# Patient Record
Sex: FEMALE | Race: WHITE | HISPANIC OR LATINO | Employment: OTHER | URBAN - METROPOLITAN AREA
[De-identification: names, ages, dates, MRNs, and addresses within clinical notes are randomized per-mention and may not be internally consistent; named-entity substitution may affect disease eponyms.]

---

## 2020-05-13 ENCOUNTER — OFFICE VISIT (OUTPATIENT)
Dept: URGENT CARE | Facility: CLINIC | Age: 70
End: 2020-05-13
Payer: MEDICAID

## 2020-05-13 VITALS
OXYGEN SATURATION: 99 % | HEIGHT: 60 IN | BODY MASS INDEX: 29.45 KG/M2 | WEIGHT: 150 LBS | TEMPERATURE: 98 F | RESPIRATION RATE: 16 BRPM | HEART RATE: 67 BPM

## 2020-05-13 DIAGNOSIS — Z71.89 EDUCATED ABOUT COVID-19 VIRUS INFECTION: Primary | ICD-10-CM

## 2020-05-13 LAB — SARS-COV-2 IGG SERPLBLD QL IA.RAPID: NEGATIVE

## 2020-05-13 PROCEDURE — U0002 COVID-19 LAB TEST NON-CDC: HCPCS

## 2020-05-13 PROCEDURE — 99201 PR OFFICE/OUTPT VISIT,NEW,LEVL I: CPT | Mod: S$GLB,,, | Performed by: PHYSICIAN ASSISTANT

## 2020-05-13 PROCEDURE — 86769 SARS-COV-2 COVID-19 ANTIBODY: CPT

## 2020-05-13 PROCEDURE — 99201 PR OFFICE/OUTPT VISIT,NEW,LEVL I: ICD-10-PCS | Mod: S$GLB,,, | Performed by: PHYSICIAN ASSISTANT

## 2020-05-13 RX ORDER — BRIMONIDINE TARTRATE, TIMOLOL MALEATE 2; 5 MG/ML; MG/ML
SOLUTION/ DROPS OPHTHALMIC
COMMUNITY
Start: 2020-04-16

## 2020-05-13 RX ORDER — ROSUVASTATIN CALCIUM 10 MG/1
TABLET, COATED ORAL
COMMUNITY
Start: 2020-03-19

## 2020-05-13 NOTE — PROGRESS NOTES
Subjective:       Patient ID: Melisa Winkler is a 70 y.o. female.    Vitals:  height is 5' (1.524 m) and weight is 68 kg (150 lb). Her temperature is 98.3 °F (36.8 °C). Her pulse is 67. Her respiration is 16 and oxygen saturation is 99%.     Chief Complaint: Labs Only (covid -19 swab)    Patient requesting COVID testing. She denies any symptoms at this time. Reports that she does occasionally have a runny nose and mild cough however nothing different than usual. Reports that she has been staying at home and when she does go out, she always wears a mask.     was used for this visit.      Constitution: Negative for chills, sweating, fatigue and fever.   HENT: Negative for ear pain, congestion, sinus pain, sinus pressure, sore throat and voice change.    Neck: Negative for painful lymph nodes.   Eyes: Negative for eye redness.   Respiratory: Negative for chest tightness, cough, sputum production, bloody sputum, COPD, shortness of breath, stridor, wheezing and asthma.    Gastrointestinal: Negative for nausea and vomiting.   Musculoskeletal: Negative for muscle ache.   Skin: Negative for rash.   Allergic/Immunologic: Negative for seasonal allergies and asthma.   Hematologic/Lymphatic: Negative for swollen lymph nodes.       Objective:      Physical Exam   Constitutional: She is oriented to person, place, and time. She appears well-developed and well-nourished. She is cooperative.  Non-toxic appearance. She does not have a sickly appearance. She does not appear ill. No distress.   HENT:   Head: Normocephalic and atraumatic.   Right Ear: Hearing, tympanic membrane, external ear and ear canal normal.   Left Ear: Hearing, tympanic membrane, external ear and ear canal normal.   Nose: Nose normal. No mucosal edema, rhinorrhea or nasal deformity. No epistaxis. Right sinus exhibits no maxillary sinus tenderness and no frontal sinus tenderness. Left sinus exhibits no maxillary sinus tenderness and  no frontal sinus tenderness.   Mouth/Throat: Uvula is midline, oropharynx is clear and moist and mucous membranes are normal. No trismus in the jaw. Normal dentition. No uvula swelling. No oropharyngeal exudate, posterior oropharyngeal edema or posterior oropharyngeal erythema.   Eyes: Conjunctivae and lids are normal. No scleral icterus.   Neck: Trachea normal, full passive range of motion without pain and phonation normal. Neck supple. No neck rigidity. No edema and no erythema present.   Cardiovascular: Normal rate, regular rhythm, normal heart sounds, intact distal pulses and normal pulses.   Pulmonary/Chest: Effort normal and breath sounds normal. No respiratory distress. She has no decreased breath sounds. She has no rhonchi.   Abdominal: Normal appearance.   Musculoskeletal: Normal range of motion. She exhibits no edema or deformity.   Neurological: She is alert and oriented to person, place, and time. She exhibits normal muscle tone. Coordination normal.   Skin: Skin is warm, dry, intact, not diaphoretic and not pale.   Psychiatric: She has a normal mood and affect. Her speech is normal and behavior is normal. Judgment and thought content normal. Cognition and memory are normal.   Nursing note and vitals reviewed.        Assessment:       1. Educated About Covid-19 Virus Infection        Plan:         Educated About Covid-19 Virus Infection  -     COVID-19 Routine Screening  -     COVID-19 (SARS CoV-2) IgG Antibody    Patient is currently asymptomatic and was educated on the difference between the COVID nasal swab and antibody test. Patient would like to have both done at this time. Counseling given. Patient instructed to continue social distancing and staying home until we get the results back in 24-48 hours. All questions answered. Nasal swab was obtained and blood was drawn.      Patient Instructions   What does this antibody test do? This test determines if a person's immune system has created antibodies  in response to COVID-19. Presence of the antibody indicates the individual has been infected with COVID-19. It is important to note that this test does not prove that a person is immune to future infection with COVID-19. Because this virus is new, there is not enough information at this time to determine what defines COVID-19 immunity and how long immunity may last.     What do the results mean? Test results are provided within 24-36 hours of collection. Those with a positive test should be aware that they have been previously infected. Individuals with a negative antibody test should be aware that they have not been infected by the virus or developed antibodies to COVID-19. Social distancing, good hand hygiene and wearing face mask in public are all still encouraged, regardless of the test result.     Will insurance pay for this test? Antibody testing will be determined by each insurance company based upon medical necessity.    [facility] [place]  Prevenir la propagación del Coronoavirus 2019 en hogares y comunidades residenciales      Pasos preventivos para personas con COVID-19 o que se sospecha que están infectadas (incluidas personas bajo investigación) que no necesitan estar hospitalizadas y personas infectadas con COVID-19 que hayan estado hospitalizadas ike que se determinan medicamente estables para irse a casa.    Jean médico y el personal de sanidad pública evaluarán si usted puede ser tratado en casa.   Quédese en casa excepto para obtener cuidados médicos.   Sepárese de otras personas y animales en jean hogar.   Llame por teléfono antes de ir a luis a jean médico.   Póngase amna mascarilla.   Tápese al toser y estornudar.   Lávese las salma con frecuencia.   Evite compartir objetos personales en jean hogar.   Limpie todas las superficies a jean alcance todos los días.    Monitoree eliu síntomas. Consiga ayuda médica si la enfermedad empeora (por ejemplo, dificultad para  respirar). Antes de salir a buscar ayuda, llame a jean proveedor médico.    Si tiene amna emergencia médica y necesita llamar al 911, notifique al personal que responda a jean llamada de que usted tiene, o está siendo evaluado para COVID-19. Si es posible, póngase amna mascarilla antes de que la ayuda sanitaria llegue.   Dejar de hacer aislamiento en casa. Llame a jean médico para que le asesore sobre si puede dejar de hacer aislamiento en casa.    Precauciones recomendadas para miembros del mismo hogar, compañeros íntimos y cuidadores, fuera del ámbito médico, de un paciente sintomático confirmado positivo para COVID-19 o un paciente que está siendo investigado.  Miembros del mismo hogar, compañeros íntimos y cuidadores, fuera del ámbito médico, pueden abe estado en contacto con amna persona con síntomas confirmada positivo para COVID-19 o amna persona bajo investigación. Personas con contacto cercano deberían monitorizar jean matt; deberían llamar a jean proveedor médico inmediatamente si desarrollan síntomas que sugieren que puede abe contraído COVID-19 (por ejemplo, fiebre, tos, falta de aliento).    Personas con contacto cercano deberían, además, seguir las siguientes recomendaciones:   Asegúrese de que usted puede entender y ayudar al paciente a seguir las instrucciones de jean proveedor médico, en relación con la medicación y el cuidado a seguir. Debería ayudar al paciente con eliu necesidades básicas en casa y ayudar cuando sea necesario a hacer la compra, ir a recoger las recetas médicas y otras necesidades personales.   Monitorear los síntomas del paciente. Si el paciente empeora, llame a jean proveedor médico y dígale que el paciente curtis sido confirmado positivo para COVID-19. [employer] ayudará a la oficina de jean médico a michaela las medidas adecuadas para evitar que otras personas en la oficina o en la manuel de espera se infecten. Pida al proveedor médico que llame al departamento de matt del estado para más  instrucciones. Si el paciente tiene amna emergencia médica y tiene que llamar al 911, informe al operador que responda a jean llamada de que el paciente tiene o está siendo evaluado para COVID-19.   Miembros del mismo hogar deberían quedarse en habitaciones separadas del paciente lo rashmi posible. Miembros del mismo hogar deberían utilizar otro dormitorio y cuarto de baño, si fuera posible.    Prohibir la visita de cualquier persona que no necesite estar en la casa.   Miembros del mismo hogar deberían ocuparse de las mascotas de la casa. No cuide de animales o mascotas mientras esté enfermo.   Asegúrese de que las áreas comunes de la casa tienen buena ventilación, yogi aire acondicionado o amna ventana que se pueda abrir si el clima lo permite.   Lávese las salma frecuentemente. Lávese las salma frecuentemente con jabón y agua henna al menos 20 segundos o utilice un desinfectante de salma con base de alcohol, que contenga entre 60 y 95% de alcohol. Cubriendo todas las superficies de las salma y frotándolas juntas hasta que se sequen.   Evite tocarse los ojos, la nariz y la boca antes de haberse lavado las salma.   El paciente debería llevar mascarilla cuando esté con otras personas. Si el paciente no se puede poner amna mascarilla porque, por ejemplo, tiene dificultad para respirar, usted, yogi cuidador, debería ponerse amna mascarilla cuando esté en la misma habitación que el paciente.   Utilice amna mascarilla desechable y guantes cuando toque o esté en contacto con la sally del paciente, eliu heces, eliu fluidos corporales tales yogi saliva, esputo, mucosidad nasal, vómito, orina.   o Tire las mascarillas desechables y los guantes saige pues de utilizarlos. No los reutilice.  o Cuando se quite la protección, razia quítese los guantes. Inmediatamente después lávese las salma con agua y jabón o con un desinfectante de salma con base de alcohol. Después quítese y tire la mascarilla, e inmediatamente después  lávese las salma otra vez con agua y jabón o utilice un desinfectante de salma con base de alcohol.   Evite compartir objetos del hogar con el paciente. No debería compartir platos, vasos, tazas, cubiertos, toallas, ropa de cama u otros objetos. Después de que el paciente utilice estos objetos debe lavarlos minuciosamente (toya debajo Jesus la colada minuciosamente).   Limpie todas las superficies de fácil alcance, yogi las encimeras, las mesas, los pomos de las juan manuel, los picaportes del baño, el retrete, teléfonos, teclados, tabletas y las mesillas de noche, todos los días. Además, lave cualquier superficie que pueda tener sally, heces o fluidos corporales.   Utilice los productos de limpieza o las toallitas según indiquen las etiquetas de los mismos. Las etiquetas contienen la información para utilizar estos productos de manera loja y eficiente, además de las precauciones que debe michaela al utilizar dichos productos, tales yogi el uso de guantes o buena ventilación.   Lave la colada minuciosamente.  o Retire inmediatamente cualquier prenda o ropa de cama que tenga sally, heces o fluidos corporales.  o Utilice guantes desechables cuando toque objetos sucios y separe los objetos sucios de jean cuerpo. Lávese las salma (con jabón y agua o con un desinfectante de salma con base de alcohol) inmediatamente después de quitarse los guantes.  o Rupali y siga las instrucciones en las etiquetas de la ropa y el detergente. En general, utilice un detergente corriente siguiendo las instrucciones de la lavadora y séquelo meticulosamente utilizando la temperatura mas georgia recomendada en la etiqueta de la ropa.   Coloque todos los guantes desechables, las mascarillas y otros objetos contaminados en un contenedor reforzado antes de tirarlo junto con otros desechos del hogar. Lávese las salma (con jabón y agua o con un desinfectante de salma con base de alcohol) inmediatamente después de tocar estos objetos. Si las salma  están visiblemente sucias se recomienda lavarlas con agua y con jabón.    Consulte cualquier otra pregunta con jean departamento de matt local o estatal o con jean proveedor médico. Compruebe las horas en las que se puede contactar al departamento de matt local.    Para más información, siga el enlace del CDC que encontrará a continuación.    https://www.cdc.gov/coronavirus/2019-ncov/hcp/guidance-prevent-spread-sp.html

## 2020-05-13 NOTE — PATIENT INSTRUCTIONS
What does this antibody test do? This test determines if a person's immune system has created antibodies in response to COVID-19. Presence of the antibody indicates the individual has been infected with COVID-19. It is important to note that this test does not prove that a person is immune to future infection with COVID-19. Because this virus is new, there is not enough information at this time to determine what defines COVID-19 immunity and how long immunity may last.     What do the results mean? Test results are provided within 24-36 hours of collection. Those with a positive test should be aware that they have been previously infected. Individuals with a negative antibody test should be aware that they have not been infected by the virus or developed antibodies to COVID-19. Social distancing, good hand hygiene and wearing face mask in public are all still encouraged, regardless of the test result.     Will insurance pay for this test? Antibody testing will be determined by each insurance company based upon medical necessity.    [facility]  Prevenir la propagación del Coronoavirus 2019 en hogares y comunidades residenciales      Pasos preventivos para personas con COVID-19 o que se sospecha que están infectadas (incluidas personas bajo investigación) que no necesitan estar hospitalizadas y personas infectadas con COVID-19 que hayan estado hospitalizadas ike que se determinan medicamente estables para irse a casa.    Jean médico y el personal de sanidad pública evaluarán si usted puede ser tratado en casa.   Quédese en casa excepto para obtener cuidados médicos.   Sepárese de otras personas y animales en jean hogar.   Llame por teléfono antes de ir a luis a jean médico.   Póngase amna mascarilla.   Tápese al toser y estornudar.   Lávese las salma con frecuencia.   Evite compartir objetos personales en jean hogar.   Limpie todas las superficies a jean alcance todos los días.     Monitoree eliu síntomas. Consiga ayuda médica si la enfermedad empeora (por ejemplo, dificultad para respirar). Antes de salir a buscar ayuda, llame a jean proveedor médico.    Si tiene amna emergencia médica y necesita llamar al 911, notifique al personal que responda a jean llamada de que usted tiene, o está siendo evaluado para COVID-19. Si es posible, póngase amna mascarilla antes de que la ayuda sanitaria llegue.   Dejar de hacer aislamiento en casa. Llame a jean médico para que le asesore sobre si puede dejar de hacer aislamiento en casa.    Precauciones recomendadas para miembros del mismo hogar, compañeros íntimos y cuidadores, fuera del ámbito médico, de un paciente sintomático confirmado positivo para COVID-19 o un paciente que está siendo investigado.  Miembros del mismo hogar, compañeros íntimos y cuidadores, fuera del ámbito médico, pueden abe estado en contacto con amna persona con síntomas confirmada positivo para COVID-19 o amna persona bajo investigación. Personas con contacto cercano deberían monitorizar jean matt; deberían llamar a jean proveedor médico inmediatamente si desarrollan síntomas que sugieren que puede abe contraído COVID-19 (por ejemplo, fiebre, tos, falta de aliento).    Personas con contacto cercano deberían, además, seguir las siguientes recomendaciones:   Asegúrese de que usted puede entender y ayudar al paciente a seguir las instrucciones de jean proveedor médico, en relación con la medicación y el cuidado a seguir. Debería ayudar al paciente con eliu necesidades básicas en casa y ayudar cuando sea necesario a hacer la compra, ir a recoger las recetas médicas y otras necesidades personales.   Monitorear los síntomas del paciente. Si el paciente empeora, llame a jean proveedor médico y dígale que el paciente curtis sido confirmado positivo para COVID-19. [place] ayudará a la oficina de jean médico a michaela las medidas adecuadas para evitar que otras personas en la oficina o en la manuel de espera se  infecten. Pida al proveedor médico que llame al departamento de matt del estado para más instrucciones. Si el paciente tiene amna emergencia médica y tiene que llamar al 911, informe al operador que responda a jean llamada de que el paciente tiene o está siendo evaluado para COVID-19.   Miembros del mismo hogar deberían quedarse en habitaciones separadas del paciente lo rashmi posible. Miembros del mismo hogar deberían utilizar otro dormitorio y cuarto de baño, si fuera posible.    Prohibir la visita de cualquier persona que no necesite estar en la casa.   Miembros del mismo hogar deberían ocuparse de las mascotas de la casa. No cuide de animales o mascotas mientras esté enfermo.   Asegúrese de que las áreas comunes de la casa tienen buena ventilación, yogi aire acondicionado o amna ventana que se pueda abrir si el clima lo permite.   Lávese las salma frecuentemente. Lávese las salma frecuentemente con jabón y agua henna al menos 20 segundos o utilice un desinfectante de salma con base de alcohol, que contenga entre 60 y 95% de alcohol. Cubriendo todas las superficies de las salma y frotándolas juntas hasta que se sequen.   Evite tocarse los ojos, la nariz y la boca antes de haberse lavado las salma.   El paciente debería llevar mascarilla cuando esté con otras personas. Si el paciente no se puede poner amna mascarilla porque, por ejemplo, tiene dificultad para respirar, usted, yogi cuidador, debería ponerse amna mascarilla cuando esté en la misma habitación que el paciente.   Utilice amna mascarilla desechable y guantes cuando toque o esté en contacto con la sally del paciente, eliu heces, eliu fluidos corporales tales yogi saliva, esputo, mucosidad nasal, vómito, orina.   o Tire las mascarillas desechables y los guantes saige pues de utilizarlos. No los reutilice.  o Cuando se quite la protección, razia quítese los guantes. Inmediatamente después lávese las salma con agua y jabón o con un desinfectante de salma  con base de alcohol. Después quítese y tire la mascarilla, e inmediatamente después lávese las salma otra vez con agua y jabón o utilice un desinfectante de salma con base de alcohol.   Evite compartir objetos del hogar con el paciente. No debería compartir platos, vasos, tazas, cubiertos, toallas, ropa de cama u otros objetos. Después de que el paciente utilice estos objetos debe lavarlos minuciosamente (toya debajo Jesus la colada minuciosamente).   Limpie todas las superficies de fácil alcance, yogi las encimeras, las mesas, los pomos de las juan manuel, los picaportes del baño, el retrete, teléfonos, teclados, tabletas y las mesillas de noche, todos los días. Además, lave cualquier superficie que pueda tener salyl, heces o fluidos corporales.   Utilice los productos de limpieza o las toallitas según indiquen las etiquetas de los mismos. Las etiquetas contienen la información para utilizar estos productos de manera loja y eficiente, además de las precauciones que debe michaela al utilizar dichos productos, tales yogi el uso de guantes o buena ventilación.   Lave la colada minuciosamente.  o Retire inmediatamente cualquier prenda o ropa de cama que tenga aslly, heces o fluidos corporales.  o Utilice guantes desechables cuando toque objetos sucios y separe los objetos sucios de jean cuerpo. Lávese las salma (con jabón y agua o con un desinfectante de salma con base de alcohol) inmediatamente después de quitarse los guantes.  o Rupali y siga las instrucciones en las etiquetas de la ropa y el detergente. En general, utilice un detergente corriente siguiendo las instrucciones de la lavadora y séquelo meticulosamente utilizando la temperatura mas georgia recomendada en la etiqueta de la ropa.   Coloque todos los guantes desechables, las mascarillas y otros objetos contaminados en un contenedor reforzado antes de tirarlo junto con otros desechos del hogar. Lávese las salma (con jabón y agua o con un desinfectante de salma  con base de alcohol) inmediatamente después de tocar estos objetos. Si las salma están visiblemente sucias se recomienda lavarlas con agua y con jabón.    Consulte cualquier otra pregunta con jean departamento de matt local o estatal o con jean proveedor médico. Compruebe las horas en las que se puede contactar al departamento de matt local.    Para más información, siga el enlace del CDC que encontrará a continuación.    https://www.cdc.gov/coronavirus/2019-ncov/hcp/guidance-prevent-spread-sp.html

## 2020-05-14 ENCOUNTER — TELEPHONE (OUTPATIENT)
Dept: URGENT CARE | Facility: CLINIC | Age: 70
End: 2020-05-14

## 2020-05-14 LAB — SARS-COV-2 RNA RESP QL NAA+PROBE: NOT DETECTED

## 2020-05-28 ENCOUNTER — OFFICE VISIT (OUTPATIENT)
Dept: URGENT CARE | Facility: CLINIC | Age: 70
End: 2020-05-28
Payer: MEDICAID

## 2020-05-28 VITALS
BODY MASS INDEX: 29.45 KG/M2 | HEIGHT: 60 IN | OXYGEN SATURATION: 98 % | TEMPERATURE: 99 F | HEART RATE: 83 BPM | WEIGHT: 150 LBS

## 2020-05-28 DIAGNOSIS — Z20.822 EXPOSURE TO COVID-19 VIRUS: ICD-10-CM

## 2020-05-28 DIAGNOSIS — B34.9 VIRAL SYNDROME: Primary | ICD-10-CM

## 2020-05-28 PROCEDURE — U0003 INFECTIOUS AGENT DETECTION BY NUCLEIC ACID (DNA OR RNA); SEVERE ACUTE RESPIRATORY SYNDROME CORONAVIRUS 2 (SARS-COV-2) (CORONAVIRUS DISEASE [COVID-19]), AMPLIFIED PROBE TECHNIQUE, MAKING USE OF HIGH THROUGHPUT TECHNOLOGIES AS DESCRIBED BY CMS-2020-01-R: HCPCS

## 2020-05-28 PROCEDURE — 99213 PR OFFICE/OUTPT VISIT, EST, LEVL III, 20-29 MIN: ICD-10-PCS | Mod: S$GLB,,, | Performed by: PHYSICIAN ASSISTANT

## 2020-05-28 PROCEDURE — 99213 OFFICE O/P EST LOW 20 MIN: CPT | Mod: S$GLB,,, | Performed by: PHYSICIAN ASSISTANT

## 2020-05-28 RX ORDER — BENZONATATE 200 MG/1
200 CAPSULE ORAL 3 TIMES DAILY PRN
Qty: 30 CAPSULE | Refills: 0 | Status: SHIPPED | OUTPATIENT
Start: 2020-05-28 | End: 2020-06-07

## 2020-05-28 NOTE — PROGRESS NOTES
Subjective:       Patient ID: Melisa Winkler is a 70 y.o. female.    Vitals:  height is 5' (1.524 m) and weight is 68 kg (150 lb). Her tympanic temperature is 99.4 °F (37.4 °C). Her pulse is 83. Her oxygen saturation is 98%.     Chief Complaint: COVID-19 Concerns    Ms. Vincent presents with her son for evaluation of mild congestion and cough for the past 6 days.  She denies any body aches, fevers, chills, nausea, vomiting, diarrhea.  She denies any shortness of breath, chest pain, bilateral lower extremity edema.  She lives in a household with her son and her son's girlfriend, which just tested positive for COVID-19 today.  She has been taking Mucinex with relief of symptoms.        Constitution: Negative for chills, sweating, fatigue and fever.   HENT: Positive for congestion. Negative for ear pain, sinus pain, sinus pressure, sore throat and voice change.    Neck: Negative for painful lymph nodes.   Eyes: Negative for eye redness.   Respiratory: Positive for cough. Negative for chest tightness, sputum production, bloody sputum, COPD, shortness of breath, stridor, wheezing and asthma.    Gastrointestinal: Negative for nausea and vomiting.   Musculoskeletal: Negative for muscle ache.   Skin: Negative for rash.   Allergic/Immunologic: Negative for seasonal allergies and asthma.   Hematologic/Lymphatic: Negative for swollen lymph nodes.       Objective:      Physical Exam   Constitutional: She is oriented to person, place, and time. She appears well-developed and well-nourished. She is cooperative.  Non-toxic appearance. She does not have a sickly appearance. She does not appear ill. No distress.   HENT:   Head: Normocephalic and atraumatic.   Right Ear: Hearing, tympanic membrane, external ear and ear canal normal.   Left Ear: Hearing, tympanic membrane, external ear and ear canal normal.   Nose: Nose normal. No mucosal edema, rhinorrhea or nasal deformity. No epistaxis. Right sinus exhibits no maxillary  sinus tenderness and no frontal sinus tenderness. Left sinus exhibits no maxillary sinus tenderness and no frontal sinus tenderness.   Mouth/Throat: Uvula is midline, oropharynx is clear and moist and mucous membranes are normal. No trismus in the jaw. Normal dentition. No uvula swelling. No oropharyngeal exudate, posterior oropharyngeal edema or posterior oropharyngeal erythema.   Eyes: Conjunctivae and lids are normal. No scleral icterus.   Neck: Trachea normal, full passive range of motion without pain and phonation normal. Neck supple. No neck rigidity. No edema and no erythema present.   Cardiovascular: Normal rate, regular rhythm, normal heart sounds, intact distal pulses and normal pulses.   Pulmonary/Chest: Effort normal and breath sounds normal. No stridor. No respiratory distress. She has no decreased breath sounds. She has no wheezes. She has no rhonchi. She has no rales.   Abdominal: Normal appearance.   Musculoskeletal: Normal range of motion. She exhibits no edema or deformity.   Neurological: She is alert and oriented to person, place, and time. She exhibits normal muscle tone. Coordination normal.   Skin: Skin is warm, dry, intact, not diaphoretic and not pale.   Psychiatric: She has a normal mood and affect. Her speech is normal and behavior is normal. Judgment and thought content normal. Cognition and memory are normal.   Nursing note and vitals reviewed.        Assessment:       1. Viral syndrome    2. Exposure to Covid-19 Virus        Plan:         Viral syndrome  -     COVID-19 Routine Screening    Exposure to Covid-19 Virus    Other orders  -     benzonatate (TESSALON) 200 MG capsule; Take 1 capsule (200 mg total) by mouth 3 (three) times daily as needed for Cough (cough).  Dispense: 30 capsule; Refill: 0      Patient Instructions   PLEASE READ YOUR DISCHARGE INSTRUCTIONS ENTIRELY AS IT CONTAINS IMPORTANT INFORMATION.  Please quarantine at home until your  COVID-19 testing results.  -  "Rest.    - Drink plenty of fluids.    - Tylenol or Ibuprofen as directed as needed for fever/pain.    - If you were prescribed antibiotics, please take them to completion.  - If you are female and on birth control pills - please use additional methods of contraception to prevent pregnancy while on antibiotics and for one cycle after.   - If you were prescribed a narcotic medication or muscle relaxer, do not drive or operate heavy equipment or machinery while taking these medications, as they can cause drowsiness.   - If you smoke, please stop smoking.  -You must understand that you've received an Urgent Care treatment only and that you may be released before all your medical problems are known or treated. You, the patient, will    arrange for follow up care as instructed. Please arrange follow up with your primary medical clinic as soon as possible.   - Follow up with your PCP or specialty clinic as directed in the next 1-2 weeks if not improved or as needed.  You can call [phone] to schedule an appointment with the appropriate provider.    - Please return to Urgent Care or to the Emergency Department if your symptoms worsen.    Patient aware and verbalized understanding.    Viral Syndrome (Adult)  A viral illness may cause a number of symptoms. The symptoms depend on the part of the body that the virus affects. If it settles in your nose, throat, and lungs, it may cause cough, sore throat, congestion, and sometimes headache. If it settles in your stomach and intestinal tract, it may cause vomiting and diarrhea. Sometimes it causes vague symptoms like "aching all over," feeling tired, loss of appetite, or fever.  A viral illness usually lasts 1 to 2 weeks, but sometimes it lasts longer. In some cases, a more serious infection can look like a viral syndrome in the first few days of the illness. You may need another exam and additional tests to know the difference. Watch for the warning signs listed " below.  Home care  Follow these guidelines for taking care of yourself at home:  · If symptoms are severe, rest at home for the first 2 to 3 days.  · Stay away from cigarette smoke - both your smoke and the smoke from others.  · You may use over-the-counter acetaminophen or ibuprofen for fever, muscle aching, and headache, unless another medicine was prescribed for this. If you have chronic liver or kidney disease or ever had a stomach ulcer or GI bleeding, talk with your doctor before using these medicines. No one who is younger than 18 and ill with a fever should take aspirin. It may cause severe disease or death.  · Your appetite may be poor, so a light diet is fine. Avoid dehydration by drinking 8 to 12 8-ounce glasses of fluids each day. This may include water; orange juice; lemonade; apple, grape, and cranberry juice; clear fruit drinks; electrolyte replacement and sports drinks; and decaffeinated teas and coffee. If you have been diagnosed with a kidney disease, ask your doctor how much and what types of fluids you should drink to prevent dehydration. If you have kidney disease, drinking too much fluid can cause it build up in the your body and be dangerous to your health.  · Over-the-counter remedies won't shorten the length of the illness but may be helpful for cough, sore throat; and nasal and sinus congestion. Don't use decongestants if you have high blood pressure.  Follow-up care  Follow up with your healthcare provider if you do not improve over the next week.  Call 911  Get emergency medical care if any of the following occur:  · Convulsion  · Feeling weak, dizzy, or like you are going to faint  · Chest pain, shortness of breath, wheezing, or difficulty breathing  When to seek medical advice  Call your healthcare provider right away if any of these occur:  · Cough with lots of colored sputum (mucus) or blood in your sputum  · Chest pain, shortness of breath, wheezing, or difficulty breathing  · Severe  headache; face, neck, or ear pain  · Severe, constant pain in the lower right side of your belly (abdominal)  · Continued vomiting (cant keep liquids down)  · Frequent diarrhea (more than 5 times a day); blood (red or black color) or mucus in diarrhea  · Feeling weak, dizzy, or like you are going to faint  · Extreme thirst  · Fever of 100.4°F (38°C) or higher, or as directed by your healthcare provider  Date Last Reviewed: 9/25/2015  © 6736-5060 [employer]. [address] [place] [id number]. All rights reserved. This information is not intended as a substitute for professional medical care. Always follow your healthcare professional's instructions.      [facility]  Prevenir la propagación del Coronoavirus 2019 en hogares y comunidades residenciales      Pasos preventivos para personas con COVID-19 o que se sospecha que están infectadas (incluidas personas bajo investigación) que no necesitan estar hospitalizadas y personas infectadas con COVID-19 que hayan estado hospitalizadas ike que se determinan medicamente estables para irse a casa.    Jean médico y el personal de sanidad pública evaluarán si usted puede ser tratado en casa.   Quédese en casa excepto para obtener cuidados médicos.   Sepárese de otras personas y animales en jean hogar.   Llame por teléfono antes de ir a luis a jean médico.   Póngase amna mascarilla.   Tápese al toser y estornudar.   Lávese las salma con frecuencia.   Evite compartir objetos personales en jean hogar.   Limpie todas las superficies a jean alcance todos los días.    Monitoree eliu síntomas. Consiga ayuda médica si la enfermedad empeora (por ejemplo, dificultad para respirar). Antes de salir a buscar ayuda, llame a jean proveedor médico.    Si tiene amna emergencia médica y necesita llamar al 911, notifique al personal que responda a jean llamada de que usted tiene, o está siendo evaluado para COVID-19. Si es posible, póngase amna  mascarilla antes de que la ayuda sanitaria llegue.   Dejar de hacer aislamiento en casa. Llame a jean médico para que le asesore sobre si puede dejar de hacer aislamiento en casa.    Precauciones recomendadas para miembros del mismo hogar, compañeros íntimos y cuidadores, fuera del ámbito médico, de un paciente sintomático confirmado positivo para COVID-19 o un paciente que está siendo investigado.  Miembros del mismo hogar, compañeros íntimos y cuidadores, fuera del ámbito médico, pueden abe estado en contacto con amna persona con síntomas confirmada positivo para COVID-19 o amna persona bajo investigación. Personas con contacto cercano deberían monitorizar jean matt; deberían llamar a jean proveedor médico inmediatamente si desarrollan síntomas que sugieren que puede abe contraído COVID-19 (por ejemplo, fiebre, tos, falta de aliento).    Personas con contacto cercano deberían, además, seguir las siguientes recomendaciones:   Asegúrese de que usted puede entender y ayudar al paciente a seguir las instrucciones de jean proveedor médico, en relación con la medicación y el cuidado a seguir. Debería ayudar al paciente con eliu necesidades básicas en casa y ayudar cuando sea necesario a hacer la compra, ir a recoger las recetas médicas y otras necesidades personales.   Monitorear los síntomas del paciente. Si el paciente empeora, llame a jean proveedor médico y dígale que el paciente curtis sido confirmado positivo para COVID-19. [place] ayudará a la oficina de jean médico a michaela las medidas adecuadas para evitar que otras personas en la oficina o en la manuel de espera se infecten. Pida al proveedor médico que llame al departamento de matt del estado para más instrucciones. Si el paciente tiene amna emergencia médica y tiene que llamar al 911, informe al operador que responda a jean llamada de que el paciente tiene o está siendo evaluado para COVID-19.   Miembros del mismo hogar deberían quedarse en habitaciones separadas del paciente  lo rashmi posible. Miembros del mismo hogar deberían utilizar otro dormitorio y cuarto de baño, si fuera posible.    Prohibir la visita de cualquier persona que no necesite estar en la casa.   Miembros del mismo hogar deberían ocuparse de las mascotas de la casa. No cuide de animales o mascotas mientras esté enfermo.   Asegúrese de que las áreas comunes de la casa tienen buena ventilación, yogi aire acondicionado o amna ventana que se pueda abrir si el clima lo permite.   Lávese las salma frecuentemente. Lávese las salma frecuentemente con jabón y agua henna al menos 20 segundos o utilice un desinfectante de salma con base de alcohol, que contenga entre 60 y 95% de alcohol. Cubriendo todas las superficies de las salma y frotándolas juntas hasta que se sequen.   Evite tocarse los ojos, la nariz y la boca antes de haberse lavado las salma.   El paciente debería llevar mascarilla cuando esté con otras personas. Si el paciente no se puede poner amna mascarilla porque, por ejemplo, tiene dificultad para respirar, usted, yogi cuidador, debería ponerse amna mascarilla cuando esté en la misma habitación que el paciente.   Utilice amna mascarilla desechable y guantes cuando toque o esté en contacto con la sally del paciente, eliu heces, eliu fluidos corporales tales yogi saliva, esputo, mucosidad nasal, vómito, orina.   o Tire las mascarillas desechables y los guantes saige pues de utilizarlos. No los reutilice.  o Cuando se quite la protección, razia quítese los guantes. Inmediatamente después lávese las salma con agua y jabón o con un desinfectante de salma con base de alcohol. Después quítese y tire la mascarilla, e inmediatamente después lávese las salma otra vez con agua y jabón o utilice un desinfectante de salma con base de alcohol.   Evite compartir objetos del hogar con el paciente. No debería compartir platos, vasos, tazas, cubiertos, toallas, ropa de cama u otros objetos. Después de que el paciente utilice  estos objetos debe lavarlos minuciosamente (toya debajo Jesus la colada minuciosamente).   Limpie todas las superficies de fácil alcance, yogi las encimeras, las mesas, los pomos de las juan manuel, los picaportes del baño, el retrete, teléfonos, teclados, tabletas y las mesillas de noche, todos los días. Además, lave cualquier superficie que pueda tener sally, heces o fluidos corporales.   Utilice los productos de limpieza o las toallitas según indiquen las etiquetas de los mismos. Las etiquetas contienen la información para utilizar estos productos de manera loja y eficiente, además de las precauciones que debe michaela al utilizar dichos productos, tales yogi el uso de guantes o buena ventilación.   Lave la colada minuciosamente.  o Retire inmediatamente cualquier prenda o ropa de cama que tenga sally, heces o fluidos corporales.  o Utilice guantes desechables cuando toque objetos sucios y separe los objetos sucios de jean cuerpo. Lávese las salma (con jabón y agua o con un desinfectante de salma con base de alcohol) inmediatamente después de quitarse los guantes.  o Rupali y siga las instrucciones en las etiquetas de la ropa y el detergente. En general, utilice un detergente corriente siguiendo las instrucciones de la lavadora y séquelo meticulosamente utilizando la temperatura mas georgia recomendada en la etiqueta de la ropa.   Coloque todos los guantes desechables, las mascarillas y otros objetos contaminados en un contenedor reforzado antes de tirarlo junto con otros desechos del hogar. Lávese las salma (con jabón y agua o con un desinfectante de salma con base de alcohol) inmediatamente después de tocar estos objetos. Si las salma están visiblemente sucias se recomienda lavarlas con agua y con jabón.    Consulte cualquier otra pregunta con jean departamento de matt local o estatal o con jean proveedor médico. Compruebe las horas en las que se puede contactar al departamento de matt local.    Para más  información, siga el enlace del CDC que encontrará a continuación.    https://www.cdc.gov/coronavirus/2019-ncov/hcp/guidance-prevent-spread-sp.html

## 2020-05-28 NOTE — PATIENT INSTRUCTIONS
"PLEASE READ YOUR DISCHARGE INSTRUCTIONS ENTIRELY AS IT CONTAINS IMPORTANT INFORMATION.  Please quarantine at home until your  COVID-19 testing results.  - Rest.    - Drink plenty of fluids.    - Tylenol or Ibuprofen as directed as needed for fever/pain.    - If you were prescribed antibiotics, please take them to completion.  - If you are female and on birth control pills - please use additional methods of contraception to prevent pregnancy while on antibiotics and for one cycle after.   - If you were prescribed a narcotic medication or muscle relaxer, do not drive or operate heavy equipment or machinery while taking these medications, as they can cause drowsiness.   - If you smoke, please stop smoking.  -You must understand that you've received an Urgent Care treatment only and that you may be released before all your medical problems are known or treated. You, the patient, will    arrange for follow up care as instructed. Please arrange follow up with your primary medical clinic as soon as possible.   - Follow up with your PCP or specialty clinic as directed in the next 1-2 weeks if not improved or as needed.  You can call [phone] to schedule an appointment with the appropriate provider.    - Please return to Urgent Care or to the Emergency Department if your symptoms worsen.    Patient aware and verbalized understanding.    Viral Syndrome (Adult)  A viral illness may cause a number of symptoms. The symptoms depend on the part of the body that the virus affects. If it settles in your nose, throat, and lungs, it may cause cough, sore throat, congestion, and sometimes headache. If it settles in your stomach and intestinal tract, it may cause vomiting and diarrhea. Sometimes it causes vague symptoms like "aching all over," feeling tired, loss of appetite, or fever.  A viral illness usually lasts 1 to 2 weeks, but sometimes it lasts longer. In some cases, a more serious infection can look like a viral " syndrome in the first few days of the illness. You may need another exam and additional tests to know the difference. Watch for the warning signs listed below.  Home care  Follow these guidelines for taking care of yourself at home:  · If symptoms are severe, rest at home for the first 2 to 3 days.  · Stay away from cigarette smoke - both your smoke and the smoke from others.  · You may use over-the-counter acetaminophen or ibuprofen for fever, muscle aching, and headache, unless another medicine was prescribed for this. If you have chronic liver or kidney disease or ever had a stomach ulcer or GI bleeding, talk with your doctor before using these medicines. No one who is younger than 18 and ill with a fever should take aspirin. It may cause severe disease or death.  · Your appetite may be poor, so a light diet is fine. Avoid dehydration by drinking 8 to 12 8-ounce glasses of fluids each day. This may include water; orange juice; lemonade; apple, grape, and cranberry juice; clear fruit drinks; electrolyte replacement and sports drinks; and decaffeinated teas and coffee. If you have been diagnosed with a kidney disease, ask your doctor how much and what types of fluids you should drink to prevent dehydration. If you have kidney disease, drinking too much fluid can cause it build up in the your body and be dangerous to your health.  · Over-the-counter remedies won't shorten the length of the illness but may be helpful for cough, sore throat; and nasal and sinus congestion. Don't use decongestants if you have high blood pressure.  Follow-up care  Follow up with your healthcare provider if you do not improve over the next week.  Call 911  Get emergency medical care if any of the following occur:  · Convulsion  · Feeling weak, dizzy, or like you are going to faint  · Chest pain, shortness of breath, wheezing, or difficulty breathing  When to seek medical advice  Call your healthcare provider right away if any of these  occur:  · Cough with lots of colored sputum (mucus) or blood in your sputum  · Chest pain, shortness of breath, wheezing, or difficulty breathing  · Severe headache; face, neck, or ear pain  · Severe, constant pain in the lower right side of your belly (abdominal)  · Continued vomiting (cant keep liquids down)  · Frequent diarrhea (more than 5 times a day); blood (red or black color) or mucus in diarrhea  · Feeling weak, dizzy, or like you are going to faint  · Extreme thirst  · Fever of 100.4°F (38°C) or higher, or as directed by your healthcare provider  Date Last Reviewed: 9/25/2015 © 2000-2017 [employer]. [address] [place] [address], [place] [address]. All rights reserved. This information is not intended as a substitute for professional medical care. Always follow your healthcare professional's instructions.      [facility]  Prevenir la propagación del Coronoavirus 2019 en hogares y comunidades residenciales      Pasos preventivos para personas con COVID-19 o que se sospecha que están infectadas (incluidas personas bajo investigación) que no necesitan estar hospitalizadas y personas infectadas con COVID-19 que hayan estado hospitalizadas ike que se determinan medicamente estables para irse a casa.    Jean médico y el personal de sanidad pública evaluarán si usted puede ser tratado en casa.   Quédese en casa excepto para obtener cuidados médicos.   Sepárese de otras personas y animales en jean hogar.   Llame por teléfono antes de ir a luis a jean médico.   Póngase amna mascarilla.   Tápese al toser y estornudar.   Lávese las salma con frecuencia.   Evite compartir objetos personales en jean hogar.   Limpie todas las superficies a jean alcance todos los días.    Monitoree eliu síntomas. Consiga ayuda médica si la enfermedad empeora (por ejemplo, dificultad para respirar). Antes de salir a buscar ayuda, llame a jean proveedor médico.    Si tiene amna emergencia médica y  necesita llamar al 911, notifique al personal que responda a jean llamada de que usted tiene, o está siendo evaluado para COVID-19. Si es posible, póngase amna mascarilla antes de que la ayuda sanitaria llegue.   Dejar de hacer aislamiento en casa. Llame a jean médico para que le asesore sobre si puede dejar de hacer aislamiento en casa.    Precauciones recomendadas para miembros del mismo hogar, compañeros íntimos y cuidadores, fuera del ámbito médico, de un paciente sintomático confirmado positivo para COVID-19 o un paciente que está siendo investigado.  Miembros del mismo hogar, compañeros íntimos y cuidadores, fuera del ámbito médico, pueden abe estado en contacto con amna persona con síntomas confirmada positivo para COVID-19 o amna persona bajo investigación. Personas con contacto cercano deberían monitorizar jean matt; deberían llamar a jean proveedor médico inmediatamente si desarrollan síntomas que sugieren que puede abe contraído COVID-19 (por ejemplo, fiebre, tos, falta de aliento).    Personas con contacto cercano deberían, además, seguir las siguientes recomendaciones:   Asegúrese de que usted puede entender y ayudar al paciente a seguir las instrucciones de jean proveedor médico, en relación con la medicación y el cuidado a seguir. Debería ayudar al paciente con eliu necesidades básicas en casa y ayudar cuando sea necesario a hacer la compra, ir a recoger las recetas médicas y otras necesidades personales.   Monitorear los síntomas del paciente. Si el paciente empeora, llame a jean proveedor médico y dígale que el paciente curtis sido confirmado positivo para COVID-19. [place] ayudará a la oficina de jean médico a michaela las medidas adecuadas para evitar que otras personas en la oficina o en la manuel de espera se infecten. Pida al proveedor médico que llame al departamento de matt del estado para más instrucciones. Si el paciente tiene amna emergencia médica y tiene que llamar al 911, informe al operador que responda a  jean llamada de que el paciente tiene o está siendo evaluado para COVID-19.   Miembros del mismo hogar deberían quedarse en habitaciones separadas del paciente lo rashmi posible. Miembros del mismo hogar deberían utilizar otro dormitorio y cuarto de baño, si fuera posible.    Prohibir la visita de cualquier persona que no necesite estar en la casa.   Miembros del mismo hogar deberían ocuparse de las mascotas de la casa. No cuide de animales o mascotas mientras esté enfermo.   Asegúrese de que las áreas comunes de la casa tienen buena ventilación, yogi aire acondicionado o amna ventana que se pueda abrir si el clima lo permite.   Lávese las salma frecuentemente. Lávese las salma frecuentemente con jabón y agua henna al menos 20 segundos o utilice un desinfectante de salma con base de alcohol, que contenga entre 60 y 95% de alcohol. Cubriendo todas las superficies de las salma y frotándolas juntas hasta que se sequen.   Evite tocarse los ojos, la nariz y la boca antes de haberse lavado las salma.   El paciente debería llevar mascarilla cuando esté con otras personas. Si el paciente no se puede poner amna mascarilla porque, por ejemplo, tiene dificultad para respirar, usted, yogi cuidador, debería ponerse amna mascarilla cuando esté en la misma habitación que el paciente.   Utilice amna mascarilla desechable y guantes cuando toque o esté en contacto con la sally del paciente, eliu heces, eliu fluidos corporales tales yogi saliva, esputo, mucosidad nasal, vómito, orina.   o Tire las mascarillas desechables y los guantes saige pues de utilizarlos. No los reutilice.  o Cuando se quite la protección, razia quítese los guantes. Inmediatamente después lávese las salma con agua y jabón o con un desinfectante de salma con base de alcohol. Después quítese y tire la mascarilla, e inmediatamente después lávese las salma otra vez con agua y jabón o utilice un desinfectante de salma con base de alcohol.   Evite compartir  objetos del hogar con el paciente. No debería compartir platos, vasos, tazas, cubiertos, toallas, ropa de cama u otros objetos. Después de que el paciente utilice estos objetos debe lavarlos minuciosamente (toya debajo Jesus la colada minuciosamente).   Limpie todas las superficies de fácil alcance, yogi las encimeras, las mesas, los pomos de las juan manuel, los picaportes del baño, el retrete, teléfonos, teclados, tabletas y las mesillas de noche, todos los días. Además, lave cualquier superficie que pueda tener sally, heces o fluidos corporales.   Utilice los productos de limpieza o las toallitas según indiquen las etiquetas de los mismos. Las etiquetas contienen la información para utilizar estos productos de manera loja y eficiente, además de las precauciones que debe michaela al utilizar dichos productos, tales yogi el uso de guantes o buena ventilación.   Lave la colada minuciosamente.  o Retire inmediatamente cualquier prenda o ropa de cama que tenga sally, heces o fluidos corporales.  o Utilice guantes desechables cuando toque objetos sucios y separe los objetos sucios de jean cuerpo. Lávese las salma (con jabón y agua o con un desinfectante de salma con base de alcohol) inmediatamente después de quitarse los guantes.  o Rupali y siga las instrucciones en las etiquetas de la ropa y el detergente. En general, utilice un detergente corriente siguiendo las instrucciones de la lavadora y séquelo meticulosamente utilizando la temperatura mas georgia recomendada en la etiqueta de la ropa.   Coloque todos los guantes desechables, las mascarillas y otros objetos contaminados en un contenedor reforzado antes de tirarlo junto con otros desechos del hogar. Lávese las salma (con jabón y agua o con un desinfectante de salma con base de alcohol) inmediatamente después de tocar estos objetos. Si las salma están visiblemente sucias se recomienda lavarlas con agua y con jabón.    Consulte cualquier otra pregunta con jean  departamento de matt local o estatal o con jean proveedor médico. Compruebe las horas en las que se puede contactar al departamento de matt local.    Para más información, siga el enlace del CDC que encontrará a continuación.    https://www.cdc.gov/coronavirus/2019-ncov/hcp/guidance-prevent-spread-sp.html

## 2020-05-29 ENCOUNTER — TELEPHONE (OUTPATIENT)
Dept: URGENT CARE | Facility: CLINIC | Age: 70
End: 2020-05-29

## 2020-05-29 DIAGNOSIS — U07.1 COVID-19 VIRUS DETECTED: ICD-10-CM

## 2020-05-29 LAB — SARS-COV-2 RNA RESP QL NAA+PROBE: DETECTED

## 2020-05-29 NOTE — TELEPHONE ENCOUNTER
Spoke with patient's son to discuss positive corona virus results.  Discussed precautions listed below.  All his questions were answered and he verbalized understanding.    Your test was POSITIVE for COVID-19 (coronavirus).     Instructions for Home Care of Patients and Caretakers with Coronavirus Disease 2019     Limit visitors to the home.  Older persons and those that have chronic medical conditions such as diabetes, lung and heart disease are at increased risk for illness.    If possible, patients should use a separate bedroom while recovering. Caregivers and household members should avoid prolonged contact with the patient which means to stay 6 feet away and avoid contact with cough droplets.  When close contact is necessary, wash your hands before and immediately after contact.    Perform hand hygiene frequently. Wash your hands often with soap and water for at least 20 seconds or use an alcohol-based hand , covering all surfaces of your hands and rubbing them together until they feel dry.    Avoid touching your eyes, nose, and mouth with unwashed hands.   Avoid sharing household items with the patient. You should not share dishes, drinking glasses, cups, eating utensils, towels, bedding, or other items. After the patient uses these items, you should wash them thoroughly.   Wash laundry thoroughly.   o Immediately remove and wash clothes or bedding that have blood, stool, or body fluids on them.   Clean all high-touch surfaces, such as counters, tabletops, doorknobs, bathroom fixtures, toilets, phones, keyboards, tablets, and bedside tables, every day.   o Use a household cleaning spray or wipe, according to the label instructions. Labels contain instructions for safe and effective use of the cleaning product including precautions you should take when applying the product, such as wearing gloves and making sure you have good ventilation during use of the product.    For more information see  CDC link below.      https://www.cdc.gov/coronavirus/2019-ncov/hcp/guidance-prevent-spread.html#precautions               If your symptoms worsen or if you have any other concerns, please contact [facility] at [phone].

## 2020-06-26 ENCOUNTER — LAB VISIT (OUTPATIENT)
Dept: PRIMARY CARE CLINIC | Facility: OTHER | Age: 70
End: 2020-06-26
Payer: MEDICAID

## 2020-06-26 DIAGNOSIS — Z03.818 ENCOUNTER FOR OBSERVATION FOR SUSPECTED EXPOSURE TO OTHER BIOLOGICAL AGENTS RULED OUT: ICD-10-CM

## 2020-06-26 PROCEDURE — U0003 INFECTIOUS AGENT DETECTION BY NUCLEIC ACID (DNA OR RNA); SEVERE ACUTE RESPIRATORY SYNDROME CORONAVIRUS 2 (SARS-COV-2) (CORONAVIRUS DISEASE [COVID-19]), AMPLIFIED PROBE TECHNIQUE, MAKING USE OF HIGH THROUGHPUT TECHNOLOGIES AS DESCRIBED BY CMS-2020-01-R: HCPCS

## 2020-07-01 LAB — SARS-COV-2 RNA RESP QL NAA+PROBE: NEGATIVE

## 2020-10-08 ENCOUNTER — CLINICAL SUPPORT (OUTPATIENT)
Dept: URGENT CARE | Facility: CLINIC | Age: 70
End: 2020-10-08
Payer: MEDICAID

## 2020-10-08 VITALS — OXYGEN SATURATION: 99 % | HEART RATE: 75 BPM | TEMPERATURE: 99 F

## 2020-10-08 DIAGNOSIS — Z03.89 ENCOUNTER FOR OBSERVATION FOR OTHER SUSPECTED DISEASES AND CONDITIONS RULED OUT: Primary | ICD-10-CM

## 2020-10-08 LAB
CTP QC/QA: YES
SARS-COV-2 RDRP RESP QL NAA+PROBE: NEGATIVE

## 2020-10-08 PROCEDURE — U0002: ICD-10-PCS | Mod: QW,S$GLB,, | Performed by: FAMILY MEDICINE

## 2020-10-08 PROCEDURE — 99211 PR OFFICE/OUTPT VISIT, EST, LEVL I: ICD-10-PCS | Mod: S$GLB,,, | Performed by: FAMILY MEDICINE

## 2020-10-08 PROCEDURE — 99211 OFF/OP EST MAY X REQ PHY/QHP: CPT | Mod: S$GLB,,, | Performed by: FAMILY MEDICINE

## 2020-10-08 PROCEDURE — U0002 COVID-19 LAB TEST NON-CDC: HCPCS | Mod: QW,S$GLB,, | Performed by: FAMILY MEDICINE

## 2021-08-23 ENCOUNTER — OFFICE VISIT (OUTPATIENT)
Dept: URGENT CARE | Facility: CLINIC | Age: 71
End: 2021-08-23
Payer: MEDICAID

## 2021-08-23 VITALS
SYSTOLIC BLOOD PRESSURE: 178 MMHG | DIASTOLIC BLOOD PRESSURE: 83 MMHG | HEART RATE: 96 BPM | BODY MASS INDEX: 29.45 KG/M2 | OXYGEN SATURATION: 97 % | WEIGHT: 150 LBS | RESPIRATION RATE: 16 BRPM | TEMPERATURE: 99 F | HEIGHT: 60 IN

## 2021-08-23 DIAGNOSIS — R03.0 ELEVATED BLOOD PRESSURE READING: ICD-10-CM

## 2021-08-23 DIAGNOSIS — Z20.822 ENCOUNTER FOR LABORATORY TESTING FOR COVID-19 VIRUS: ICD-10-CM

## 2021-08-23 DIAGNOSIS — J00 NASOPHARYNGITIS: Primary | ICD-10-CM

## 2021-08-23 LAB
CTP QC/QA: YES
SARS-COV-2 RDRP RESP QL NAA+PROBE: NEGATIVE

## 2021-08-23 PROCEDURE — U0002: ICD-10-PCS | Mod: QW,S$GLB,, | Performed by: STUDENT IN AN ORGANIZED HEALTH CARE EDUCATION/TRAINING PROGRAM

## 2021-08-23 PROCEDURE — 99213 OFFICE O/P EST LOW 20 MIN: CPT | Mod: S$GLB,,, | Performed by: STUDENT IN AN ORGANIZED HEALTH CARE EDUCATION/TRAINING PROGRAM

## 2021-08-23 PROCEDURE — U0002 COVID-19 LAB TEST NON-CDC: HCPCS | Mod: QW,S$GLB,, | Performed by: STUDENT IN AN ORGANIZED HEALTH CARE EDUCATION/TRAINING PROGRAM

## 2021-08-23 PROCEDURE — 99213 PR OFFICE/OUTPT VISIT, EST, LEVL III, 20-29 MIN: ICD-10-PCS | Mod: S$GLB,,, | Performed by: STUDENT IN AN ORGANIZED HEALTH CARE EDUCATION/TRAINING PROGRAM

## 2025-04-25 ENCOUNTER — TELEPHONE (OUTPATIENT)
Dept: GASTROENTEROLOGY | Facility: CLINIC | Age: 75
End: 2025-04-25
Payer: MEDICAID

## 2025-04-25 NOTE — TELEPHONE ENCOUNTER
----- Message from Salty Moses MD sent at 4/25/2025  8:38 AM CDT -----  Please refer to the locations where Medicaid patients can be seen once we can find the reason for visit.  ----- Message -----  From: Shiva Lemus MA  Sent: 4/25/2025   8:18 AM CDT  To: Salty Moses MD    They are not answering the phone, left voicemail with callback number. I do notice this would be a New Medicaid patient, which usually would not bee seen here. Would you still schedule her?  ----- Message -----  From: Salty Moses MD  Sent: 4/25/2025   8:09 AM CDT  To: Shiva Lemus MA    Received a request for GI consult. Please call: SELENA [phone] [address] [address] To find out the reason for referral. Not clear on the request.